# Patient Record
(demographics unavailable — no encounter records)

---

## 2025-07-22 NOTE — DISCUSSION/SUMMARY
[Patient seen for WTC Monitoring ___] : Patient was seen for WTC monitoring [unfilled] [Please See Note in Chart and Documentation in Trial DB] : Please see note in chart and documentation in Trial DB. [FreeTextEntry3] : ID [id number].   HPI: 52 year old male presents for first [facility] monitoring visit. He reports a mild/slight regular cough that is sometimes dry and sometimes wet and wheezing in the chest that started after working on the [employer] effort. He had acute bronchitis and pneumonia in 2015 but no chronic pulmonary diagnosis.  He reported reflux/regurgitation into the mouth, runny/stuffy nose, blowing nose more often and/or PND that started after stopping working on the [employer] effort and has occurred in the past 12 months.  He reported diagnosis of JR 1/2015.  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 Patient sustained Tier 1 exposure. From 9/12/2001 - 9/30/2001, patient worked for 19 days, 20 hours/day, adjacent to the pile/pit, in an area coated with a visible but light layer of dust or debris.    Review of Systems: IAMQ on Trial DB reviewed with patient   Physical Exam: see Trial DB and follow up note   Imaging: none available   Assessment/Plan: - CBC, CMP, lipid panel and UA ordered - Spirometry performed and reviewed - Chest x-ray ordered - Colonoscopy up to date - Patient to obtain contemporaneous records or signed note from his PCP as recommended by [employer] to submit for potential certification of GERD and chronic sinusitis - Reviewed that JR can be certified as a MAC if above able to be certified - See Trial DB for details - Return to clinic in 1 year or sooner if needed

## 2025-07-22 NOTE — DISCUSSION/SUMMARY
[Patient seen for WTC Monitoring ___] : Patient was seen for WTC monitoring [unfilled] [Please See Note in Chart and Documentation in Trial DB] : Please see note in chart and documentation in Trial DB. [FreeTextEntry3] : ID [id number].   HPI: 52 year old male presents for first [facility] monitoring visit. He reports a mild/slight regular cough that is sometimes dry and sometimes wet and wheezing in the chest that started after working on the [employer] effort. He had acute bronchitis and pneumonia in 2015 but no chronic pulmonary diagnosis.  He reported reflux/regurgitation into the mouth, runny/stuffy nose, blowing nose more often and/or PND that started after stopping working on the [employer] effort and has occurred in the past 12 months.  He reported diagnosis of JR 1/2015.  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 From 9/12/2001 - 9/30/2001, patient worked for 19 days, 20 hours/day, adjacent to the pile/pit, in an area coated with a visible but light layer of dust or debris.    Review of Systems: IAMQ on Trial DB reviewed with patient   Physical Exam: see Trial DB and follow up note   Imaging: none available   Assessment/Plan: - CBC, CMP, lipid panel and UA ordered - Spirometry performed and reviewed - Chest x-ray ordered - Colonoscopy up to date - Patient to obtain contemporaneous records or signed note from his PCP as recommended by [employer] to submit for potential certification of GERD and chronic sinusitis - Reviewed that JR can be certified as a MAC if above able to be certified - See Trial DB for details - Return to clinic in 1 year or sooner if needed